# Patient Record
(demographics unavailable — no encounter records)

---

## 2018-02-16 NOTE — EMERGENCY DEPARTMENT REPORT
HPI





- General


Chief Complaint: Dental/Oral


Time Seen by Provider: 02/16/18 15:26





- HPI


HPI: 





The patient is a 33-year-old male who presents to ED complaining  of 8/10 pain 

in the leftt side of her mouth x 7 days .  Patient states that the pain started 

5 days ago and has increased in severity over the last 2-3 days.  The pain is 

exacerbated by eating and opening of the mouth.  She states the pain usually 

begins her on 6 PM it is worse at night when she is at home.


Patient states she's been using Orajel  which has not been helping with the pain


Patient states that it radiates towards  ear.  Patient describes a as a 

throbbing, pressure-like sensation.  Patient states otherwise well and has no 

other complaints.


Patient has had no fevers and no chills. No chest pain, no shortness of breath. 

No abdominal pain. No  shortness of breath or  recent trauma to the face. 





ED Past Medical Hx





- Past Medical History


Previous Medical History?: No





- Surgical History


Additional Surgical History: C/S, carpal jian release, hernia repair





- Social History


Smoking Status: Current Every Day Smoker


Substance Use Type: Alcohol





- Medications


Home Medications: 


 Home Medications











 Medication  Instructions  Recorded  Confirmed  Last Taken  Type


 


Acetaminophen/Codeine [Tylenol 1 tab PO Q6H PRN #10 tab 02/16/18  Unknown Rx





/Codeine # 3 tab]     


 


Amoxicillin [Amoxicillin TAB] 875 mg PO BID #20 tablet 02/16/18  Unknown Rx


 


Fluconazole [Diflucan TAB] 150 mg PO ONCE #1 tablet 02/16/18  Unknown Rx


 


Ibuprofen [Motrin] 800 mg PO Q8HR PRN #30 tablet 02/16/18  Unknown Rx














ED Review of Systems


ROS: 


Stated complaint: TOOTHACHE


Other details as noted in HPI





Constitutional: denies: chills, fever


Eyes: denies: eye pain, eye discharge, vision change


ENT: dental pain.  denies: ear pain, throat pain, congestion


Respiratory: denies: cough, shortness of breath, wheezing


Cardiovascular: denies: chest pain, palpitations


Endocrine: no symptoms reported


Gastrointestinal: denies: abdominal pain, nausea, diarrhea


Genitourinary: denies: urgency, dysuria, discharge


Musculoskeletal: denies: back pain, joint swelling, arthralgia


Skin: denies: rash, lesions


Neurological: denies: headache, weakness, paresthesias


Psychiatric: denies: anxiety, depression


Hematological/Lymphatic: denies: easy bleeding, easy bruising





Physical Exam





- Physical Exam


Vital Signs: 


 Vital Signs











  02/16/18





  15:01


 


Temperature 97.8 F


 


Pulse Rate 86


 


Respiratory 20





Rate 


 


Blood Pressure 164/108


 


O2 Sat by Pulse 100





Oximetry 











Physical Exam: 





GENERAL: Alert and oriented x3, no apparent distress, Normal Gait, atraumatic.


HEAD: Head is normocephalic and a-traumatic.


EYES: Extra ocular muscles are intact. Pupils are equal, round, and reactive to 

light and accommodation.


EARS: symetrical, atraumatic, non tender, ear canal clear and moderate cerumen, 

tympanic membrance non inflamed. gross auditory nml bilaterally. 


NOSE: Nose symetrical, Nontender,Nares appeared normal.


MOUTH:Mouth is well hydrated and without lesions. Tonsils nonerythematous or 

swollen,  Uvula midline, Tongue not elevated. Mucous membranes are moist. 

Posterior pharynx clear, no exudate or lesions. Patent airways. Tooth #19 

dental caries, partially of broken, tender to palpation.  No gingival 

enlargement, no bleeding, no pus- like discharge seen.


NECK: Supple. Non edematous, No carotid bruits.  No lymphadenopathy or 

thyromegaly.  No C-spine tenderness


LUNGS: Symetrical with respiration, No wheezing, no rales or crackles, CTAB.


HEART:  S1, S2 present, regular rate and rhythm without murmur, no rubs, no 

gallops. Non tender to palpation





SKIN:  Warm and dry, No lesions, No ulceration or induration present.











ED Course


 Vital Signs











  02/16/18





  15:01


 


Temperature 97.8 F


 


Pulse Rate 86


 


Respiratory 20





Rate 


 


Blood Pressure 164/108


 


O2 Sat by Pulse 100





Oximetry 














ED Medical Decision Making





- Medical Decision Making


33-year-old female who presents with left-sided Facial pain secondary to 

odontogenic caries


ED course: Patient received 1000 mg of amoxicillin,


 Odontogenic infection versus ear infection. Based upon  history and physical 

examination,  pain is a result of an infection of tooth number 19 and that the 

pain Pt feels on the left side of his face and towards the ear is referred pain 

from this infectious process.  


Pt has no evidence of acute impending airway compromise.


At this point, patient will be discharged home on some antibiotics and pain 

trial,  she will do well with an outpatient course of antibiotics. 


Follow up with the Dental Clinic as referred


Vital signs are normal patient is in no acute distress.


Pt had an effect uneventful ED stay





Critical care attestation.: 


If time is entered above; I have spent that time in minutes in the direct care 

of this critically ill patient, excluding procedure time.








ED Disposition


Clinical Impression: 


 Dental caries, Pain due to dental caries





Disposition: DC-01 TO HOME OR SELFCARE


Is pt being admited?: No


Does the pt Need Aspirin: No


Condition: Stable


Instructions:  Dental Caries (ED), Toothache (ED)


Additional Instructions: 


Make sure to follow up with the dentist  as discussed.


Take all your medications as you've been prescribed.


If you have any worsening symptoms or develop new symptoms please return to ED 

immediately.


Prescriptions: 


Acetaminophen/Codeine [Tylenol /Codeine # 3 tab] 1 tab PO Q6H PRN #10 tab


 PRN Reason: Pain


Amoxicillin [Amoxicillin TAB] 875 mg PO BID #20 tablet


Fluconazole [Diflucan TAB] 150 mg PO ONCE #1 tablet


Ibuprofen [Motrin] 800 mg PO Q8HR PRN #30 tablet


 PRN Reason: Pain


Referrals: 


PRIMARY CARE,MD [Primary Care Provider] - 3-5 Days


[facility] [Outside] - 3-5 Days


[facility] [Outside] - 3-5 Days


Forms:  Work/School Release Form(ED)


Time of Disposition: 15:53